# Patient Record
Sex: MALE | Race: WHITE | NOT HISPANIC OR LATINO | URBAN - METROPOLITAN AREA
[De-identification: names, ages, dates, MRNs, and addresses within clinical notes are randomized per-mention and may not be internally consistent; named-entity substitution may affect disease eponyms.]

---

## 2019-07-10 PROBLEM — 38341003 HYPERTENSION: Status: ACTIVE | Noted: 2019-07-10

## 2020-10-09 ENCOUNTER — OFFICE VISIT (OUTPATIENT)
Dept: URBAN - METROPOLITAN AREA CLINIC 13 | Facility: CLINIC | Age: 60
End: 2020-10-09

## 2020-10-09 PROBLEM — 428283002 HISTORY OF POLYP OF COLON: Status: ACTIVE | Noted: 2020-10-09

## 2020-10-26 ENCOUNTER — OFFICE VISIT (OUTPATIENT)
Dept: URBAN - METROPOLITAN AREA SURGERY CENTER 28 | Facility: SURGERY CENTER | Age: 60
End: 2020-10-26

## 2020-10-26 PROBLEM — 134407002 CHRONIC BACK PAIN: Status: ACTIVE | Noted: 2020-10-26

## 2021-08-28 ENCOUNTER — TELEPHONE ENCOUNTER (OUTPATIENT)
Dept: URBAN - METROPOLITAN AREA CLINIC 13 | Facility: CLINIC | Age: 61
End: 2021-08-28

## 2021-08-29 ENCOUNTER — TELEPHONE ENCOUNTER (OUTPATIENT)
Dept: URBAN - METROPOLITAN AREA CLINIC 13 | Facility: CLINIC | Age: 61
End: 2021-08-29

## 2021-08-29 RX ORDER — ERGOCALCIFEROL 1.25 MG/1
CAPSULE ORAL
Status: ACTIVE | COMMUNITY

## 2021-08-29 RX ORDER — LISINOPRIL 10 MG/1
TABLET ORAL
Status: ACTIVE | COMMUNITY

## 2023-10-05 ENCOUNTER — TELEPHONE ENCOUNTER (OUTPATIENT)
Dept: URBAN - METROPOLITAN AREA CLINIC 46 | Facility: CLINIC | Age: 63
End: 2023-10-05

## 2023-10-05 VITALS — BODY MASS INDEX: 35.36 KG/M2 | HEIGHT: 70 IN | WEIGHT: 247 LBS

## 2023-10-05 RX ORDER — ERGOCALCIFEROL 1.25 MG/1
TAKE 1 CAPSULE (50,000 UNITS TOTAL) BY MOUTH ONE TIME PER WEEK ONE TIME A WEEK ONE TIME CAPSULE, LIQUID FILLED ORAL
Qty: 12 EACH | Refills: 2 | Status: ACTIVE | COMMUNITY

## 2023-10-05 RX ORDER — LISINOPRIL 10 MG/1
TAKE 1 TABLET BY MOUTH EVERY DAY TABLET ORAL
Qty: 90 EACH | Refills: 0 | Status: ACTIVE | COMMUNITY

## 2023-10-05 RX ORDER — ERGOCALCIFEROL 1.25 MG/1
CAPSULE ORAL
Status: DISCONTINUED | COMMUNITY

## 2023-10-05 RX ORDER — LISINOPRIL 10 MG/1
TABLET ORAL
Status: DISCONTINUED | COMMUNITY

## 2023-10-05 RX ORDER — ATORVASTATIN CALCIUM, FILM COATED 20 MG/1
TAKE 1 TABLET BY MOUTH EVERY DAY TABLET ORAL
Qty: 90 EACH | Refills: 0 | Status: ACTIVE | COMMUNITY

## 2023-10-25 ENCOUNTER — LAB OUTSIDE AN ENCOUNTER (OUTPATIENT)
Dept: URBAN - METROPOLITAN AREA CLINIC 46 | Facility: CLINIC | Age: 63
End: 2023-10-25

## 2023-12-01 ENCOUNTER — OFFICE VISIT (OUTPATIENT)
Dept: URBAN - METROPOLITAN AREA CLINIC 46 | Facility: CLINIC | Age: 63
End: 2023-12-01
Payer: COMMERCIAL

## 2023-12-01 ENCOUNTER — DASHBOARD ENCOUNTERS (OUTPATIENT)
Age: 63
End: 2023-12-01

## 2023-12-01 ENCOUNTER — LAB OUTSIDE AN ENCOUNTER (OUTPATIENT)
Dept: URBAN - METROPOLITAN AREA CLINIC 46 | Facility: CLINIC | Age: 63
End: 2023-12-01

## 2023-12-01 VITALS
HEART RATE: 80 BPM | SYSTOLIC BLOOD PRESSURE: 155 MMHG | WEIGHT: 252.2 LBS | BODY MASS INDEX: 35.31 KG/M2 | TEMPERATURE: 97.2 F | HEIGHT: 71 IN | DIASTOLIC BLOOD PRESSURE: 92 MMHG

## 2023-12-01 DIAGNOSIS — C18.9 PRIMARY MALIGNANT NEOPLASM OF COLON: ICD-10-CM

## 2023-12-01 PROCEDURE — 99213 OFFICE O/P EST LOW 20 MIN: CPT | Performed by: INTERNAL MEDICINE

## 2023-12-01 RX ORDER — LISINOPRIL 10 MG/1
TAKE 1 TABLET BY MOUTH EVERY DAY TABLET ORAL
Qty: 90 EACH | Refills: 0 | Status: ACTIVE | COMMUNITY

## 2023-12-01 RX ORDER — ATORVASTATIN CALCIUM, FILM COATED 20 MG/1
TAKE 1 TABLET BY MOUTH EVERY DAY TABLET ORAL
Qty: 90 EACH | Refills: 0 | Status: ACTIVE | COMMUNITY

## 2023-12-01 RX ORDER — ERGOCALCIFEROL 1.25 MG/1
TAKE 1 CAPSULE (50,000 UNITS TOTAL) BY MOUTH ONE TIME PER WEEK ONE TIME A WEEK ONE TIME CAPSULE, LIQUID FILLED ORAL
Qty: 12 EACH | Refills: 2 | Status: ACTIVE | COMMUNITY

## 2023-12-01 NOTE — HPI-TODAY'S VISIT:
The 64 yo patient presents for a colon cancer surveillance. He is s/p CRC in 2019. with endoscopic resection of a polyp but after pathology revealed adenocarcinoma we referred him for a L hemicolectomy. He had a repeat colonoscopy in 1 year which was in 2020 that was polyp free. He presents now for his 3 year surveillance.  He has a family hx of CRC in his uncle. Since his surgery, he has been doing well. Denies hematochezia, unintentional weight loss or abdominal pain.

## 2024-01-09 ENCOUNTER — OFFICE VISIT (OUTPATIENT)
Dept: URBAN - METROPOLITAN AREA SURGERY CENTER 28 | Facility: SURGERY CENTER | Age: 64
End: 2024-01-09
Payer: COMMERCIAL

## 2024-01-09 DIAGNOSIS — Z85.038 PERSONAL HISTORY OF COLON CANCER: ICD-10-CM

## 2024-01-09 DIAGNOSIS — K63.5 BENIGN COLONIC POLYP: ICD-10-CM

## 2024-01-09 DIAGNOSIS — Z12.11 COLON CANCER SCREENING (HIGH RISK): ICD-10-CM

## 2024-01-09 DIAGNOSIS — Z85.038 H/O COLON CANCER, STAGE I: ICD-10-CM

## 2024-01-09 DIAGNOSIS — K57.30 DIVERTICULA, COLON: ICD-10-CM

## 2024-01-09 DIAGNOSIS — K63.5 BENIGN COLON POLYP: ICD-10-CM

## 2024-01-09 PROCEDURE — 45385 COLONOSCOPY W/LESION REMOVAL: CPT | Performed by: INTERNAL MEDICINE

## 2024-01-09 PROCEDURE — G8907 PT DOC NO EVENTS ON DISCHARG: HCPCS | Performed by: INTERNAL MEDICINE

## 2024-01-09 PROCEDURE — 00811 ANES LWR INTST NDSC NOS: CPT | Performed by: NURSE ANESTHETIST, CERTIFIED REGISTERED

## 2024-01-09 RX ORDER — ERGOCALCIFEROL 1.25 MG/1
TAKE 1 CAPSULE (50,000 UNITS TOTAL) BY MOUTH ONE TIME PER WEEK ONE TIME A WEEK ONE TIME CAPSULE, LIQUID FILLED ORAL
Qty: 12 EACH | Refills: 2 | Status: ACTIVE | COMMUNITY

## 2024-01-09 RX ORDER — ATORVASTATIN CALCIUM, FILM COATED 20 MG/1
TAKE 1 TABLET BY MOUTH EVERY DAY TABLET ORAL
Qty: 90 EACH | Refills: 0 | Status: ACTIVE | COMMUNITY

## 2024-01-09 RX ORDER — LISINOPRIL 10 MG/1
TAKE 1 TABLET BY MOUTH EVERY DAY TABLET ORAL
Qty: 90 EACH | Refills: 0 | Status: ACTIVE | COMMUNITY